# Patient Record
Sex: MALE | Race: WHITE | NOT HISPANIC OR LATINO | Employment: FULL TIME | URBAN - METROPOLITAN AREA
[De-identification: names, ages, dates, MRNs, and addresses within clinical notes are randomized per-mention and may not be internally consistent; named-entity substitution may affect disease eponyms.]

---

## 2023-10-10 DIAGNOSIS — R56.9 SEIZURE (MULTI): Primary | ICD-10-CM

## 2023-10-10 RX ORDER — LEVETIRACETAM 750 MG/1
2250 TABLET, EXTENDED RELEASE ORAL DAILY
Qty: 240 TABLET | Refills: 0 | Status: SHIPPED | OUTPATIENT
Start: 2023-10-10 | End: 2023-12-07

## 2023-10-10 NOTE — PROGRESS NOTES
Spoke to Shiva,     Had been discharge from EMU in July 2023 - with instructions to take LEV XR 3g at bedtime. He had got home and still had a large supple of LEV IR 500mg tabs that he wanted to use up. Spoke to him last week as he was going to switch over to the XR version and instructed him to take 4 750mg tablets at bedtime.     He called with side effects from the medication feeling drunk and off-balance. Upon further clarification he has only been taking LEV IR 1000mg bid rather than 1500mg bid.     Asked him to reduce his XR to 3 750mg tabs at bedtime for a total of 2250mg. He will use this dose and call me if he is still experiencing side effects.

## 2023-11-13 ENCOUNTER — TELEPHONE (OUTPATIENT)
Dept: NEUROLOGY | Facility: HOSPITAL | Age: 58
End: 2023-11-13
Payer: MEDICARE

## 2023-11-13 NOTE — TELEPHONE ENCOUNTER
"Spoke with Mr. Barraza who called to report having 2 \"big\" seizures last Thursday and last Friday. He is concerned about the intesnsity of these seizures as well as having some difficulty with his memory since then.    Dr. Jenkins had an opening tomorrow morning for clinic, so I was able to schedule the patient a virtual appointment for him to discuss these new episodes. His wife Teresita will be available during the appointment to provide collateral information.  "

## 2023-11-14 ENCOUNTER — TELEMEDICINE (OUTPATIENT)
Dept: NEUROLOGY | Facility: HOSPITAL | Age: 58
End: 2023-11-14
Payer: MEDICARE

## 2023-11-14 ENCOUNTER — TELEPHONE (OUTPATIENT)
Dept: NEUROLOGY | Facility: HOSPITAL | Age: 58
End: 2023-11-14

## 2023-11-14 DIAGNOSIS — G40.019 LOCALIZATION-RELATED EPILEPSY, INTRACTABLE (MULTI): Primary | ICD-10-CM

## 2023-11-14 DIAGNOSIS — G40.109 TEMPORAL LOBE EPILEPSY (MULTI): ICD-10-CM

## 2023-11-14 PROCEDURE — 99417 PROLNG OP E/M EACH 15 MIN: CPT | Performed by: STUDENT IN AN ORGANIZED HEALTH CARE EDUCATION/TRAINING PROGRAM

## 2023-11-14 PROCEDURE — 99215 OFFICE O/P EST HI 40 MIN: CPT | Performed by: STUDENT IN AN ORGANIZED HEALTH CARE EDUCATION/TRAINING PROGRAM

## 2023-11-14 PROCEDURE — 99443 PR PHYS/QHP TELEPHONE EVALUATION 21-30 MIN: CPT | Performed by: STUDENT IN AN ORGANIZED HEALTH CARE EDUCATION/TRAINING PROGRAM

## 2023-11-14 NOTE — TELEPHONE ENCOUNTER
Spoke today with Shiva and his wife Teresita to let them know the newly added medication Xcopri is ready for them to  at the pharmacy. Explained the titration schedule (12.5 mg daily for 2 weeks, then 25 mg daily for 2 weeks, etc etc.) and we agreed to keep in touch over the coming weeks so we are up to speed on how he is tolerating things.

## 2023-11-14 NOTE — PROGRESS NOTES
Virtual or Telephone Consent    A telephone visit (audio only) between the patient (at the originating site) and the provider (at the distant site) was utilized to provide this telehealth service.   Verbal consent was requested and obtained from Shiva Barraza on this date, 11/14/23 for a telehealth visit.       Subjective     Shiva Barraza is a 58 y.o. year old LH male who presents for follow up of seizures.    4-D CLASSIFICATION:   Type: EPE   Semiology: Psychic (Ecstatic, ángel vu) aura->Dialeptic*->Right face clonic->B/L tonic-clonic sz**   Lateralizing signs: Right M2e, Right versive   Freq: *once every 2 months (last on 11/10/23, **once every 6 months (last in 02/2023)  EZ: Left temporal   Etiology: Unknown   RMC: s/p left MHT and temporal lobectomy, mild dyslexia and memory decline   Current AEDs: -200-400, LEV XR 750mg, 4 tabs bedtime  Past AEDs: Topiramate, LAC (side effects), ZNS (side effects), Onfi (never tried)      HPI  INTERVAL HX:  He was last seen in clinic in 07/2023. At that time, we had planned to add onfi because he was continuing to have auras but apparently, he had stopped all medications in 06/2023 because of feeling fatigued. Following this, he had an AMU stay but no seizures were captured. He was placed back on the medications with a titration schedule for lamotrigine.     Since then, he followed by [facility] and was transitioned to LEV XR. He has been consistently taking LEV XR 750mg, 4 tabs bedtime and LMT as prescribed since 09/2023.     He had two witnessed seizures last week. Wife described that he reported feeling his aura because he could tell the seizure was coming on and afterward he was incoherent. No convulsion was witnessed. Since then, he feels that his memory has been poor.    Past Medical History:   Diagnosis Date    Personal history of urinary calculi     Personal history of renal calculi     Past Surgical History:   Procedure Laterality Date    HEMORRHOID SURGERY  05/03/2017     Hemorrhoidectomy    OTHER SURGICAL HISTORY  09/16/2013    Craniotomy (Therapeutic)    OTHER SURGICAL HISTORY  04/08/2014    Craniotomy For Temporal Lobectomy With Electrocorticography     Social History     Tobacco Use    Smoking status: Not on file    Smokeless tobacco: Not on file   Substance Use Topics    Alcohol use: Not on file     No family history on file.    Current Outpatient Medications:     cenobamate 12.5 mg (14)- 25 mg (14) tablets,dose pack, Take 12.5 mg by mouth once daily for 14 days, THEN 25 mg once daily for 14 days., Disp: 1 each, Rfl: 0    [START ON 1/9/2024] cenobamate 150 mg (14)- 200 mg (14) tablets,dose pack, Take 150 mg by mouth once daily for 14 days, THEN 200 mg once daily for 14 days. Do not start before January 9, 2024., Disp: 1 each, Rfl: 0    [START ON 12/12/2023] cenobamate 50 mg (14)- 100 mg (14) tablets,dose pack, Take 50 mg by mouth once daily for 14 days, THEN 100 mg once daily for 14 days. Do not start before December 12, 2023., Disp: 1 each, Rfl: 0    lamoTRIgine (LaMICtal) 200 mg tablet, TAKE 1 TABLET IN THE MORNING, TAKE 1 TABLET IN THE AFTERNOON, AND 2 TABLETS AT NIGHT, Disp: 120 tablet, Rfl: 0    levETIRAcetam XR (Keppra XR) 750 mg tablet extended release 24 hr 24 hr tablet, Take 3 tablets (2,250 mg) by mouth once daily., Disp: 240 tablet, Rfl: 0  No Known Allergies    Review of Systems  As per HPI, otherwise all other systems have been reviewed are negative for complaint.        Assessment/Plan   Shiva Barraza is a 58 y.o. year old LH male who presents for follow up of seizures.    4-D CLASSIFICATION:   Type: EPE   Semiology: Psychic (Ecstatic, ángel vu) aura->Dialeptic*->Right face clonic->B/L tonic-clonic sz**   Lateralizing signs: Right M2e, Right versive   Freq: *once every 2 months (last on 11/10/23, **once every 6 months (last in 02/2023)  EZ: Left temporal   Etiology: Unknown   RMC: s/p left MHT and temporal lobectomy, mild dyslexia and memory decline   Current AEDs:  -200-400, LEV XR 750mg, 4 tabs bedtime  Past AEDs: Topiramate, LAC (side effects), ZNS (side effects), Onfi (never tried)    Plan:  - At this time, increasing the dose of levetiracetam is likely to cause side effects without adding efficacy. It is reasonable to add another medication.  - We discussed Xcopri as an excellent adjunctive option for focal epilepsy. We will send the prescription today.  - He must continue to follow standard seizure precautions until further notice.    F/U with [facility] in 3 months.    Shayan Jenkins MD  [facility]    The total appointment time today was 45 minutes. Time included preparing to see the patient, obtaining the history, performing a medically necessary appropriate physical examination, counseling and educating the patient/family/caregiver, ordering medications, tests and procedures, referring and communicating with other providers, independently interpreting results and communicating the results to the patient/family/caregiver, care coordination] and documenting clinical information in the medical record.

## 2023-12-06 ENCOUNTER — TELEMEDICINE (OUTPATIENT)
Dept: NEUROLOGY | Facility: HOSPITAL | Age: 58
End: 2023-12-06
Payer: MEDICARE

## 2023-12-06 DIAGNOSIS — G40.109 TEMPORAL LOBE EPILEPSY (MULTI): Primary | ICD-10-CM

## 2023-12-06 DIAGNOSIS — F51.04 CHRONIC INSOMNIA: ICD-10-CM

## 2023-12-06 PROCEDURE — 99443 PR PHYS/QHP TELEPHONE EVALUATION 21-30 MIN: CPT | Performed by: STUDENT IN AN ORGANIZED HEALTH CARE EDUCATION/TRAINING PROGRAM

## 2023-12-06 NOTE — PROGRESS NOTES
Virtual or Telephone Consent    A telephone visit (audio only) between the patient (at the originating site) and the provider (at the distant site) was utilized to provide this telehealth service.   Verbal consent was requested and obtained from Shiva Barraza on this date, 12/06/23 for a telehealth visit.       Subjective     Shiva Barraza is a 58 y.o. year old   LH male who presents for follow up of seizures.    4-D CLASSIFICATION:   Type: EPE   Semiology: Psychic (Ecstatic, ángel vu) aura->Dialeptic*->Right face clonic->B/L tonic-clonic sz**   Lateralizing signs: Right M2e, Right versive   Freq: *once every 2 months (last on 11/10/23, **once every 6 months (last in 02/2023)  EZ: Left temporal   Etiology: Unknown   RMC: s/p left MHT and temporal lobectomy, mild dyslexia and memory decline   Current AEDs: -200-400, LEV XR 750mg, 4 tabs bedtime, Xcopri 25mg OD  Past AEDs: Topiramate, LAC (side effects), ZNS (side effects), Onfi (never tried)      HPI    INTERVAL HX:  He was seen last month. We added xcopri to his regimen because of continued seizures. He has been experiencing worsening fogginess, memory lapses and increased irritability since then. He hasn't had any seizures though.    Past Medical History:   Diagnosis Date    Personal history of urinary calculi     Personal history of renal calculi     Past Surgical History:   Procedure Laterality Date    HEMORRHOID SURGERY  05/03/2017    Hemorrhoidectomy    OTHER SURGICAL HISTORY  09/16/2013    Craniotomy (Therapeutic)    OTHER SURGICAL HISTORY  04/08/2014    Craniotomy For Temporal Lobectomy With Electrocorticography     Social History     Tobacco Use    Smoking status: Not on file    Smokeless tobacco: Not on file   Substance Use Topics    Alcohol use: Not on file     No family history on file.    Current Outpatient Medications:     cenobamate 12.5 mg (14)- 25 mg (14) tablets,dose pack, Take 12.5 mg by mouth once daily for 14 days, THEN 25 mg once daily for 14  days., Disp: 1 each, Rfl: 0    [START ON 1/9/2024] cenobamate 150 mg (14)- 200 mg (14) tablets,dose pack, Take 150 mg by mouth once daily for 14 days, THEN 200 mg once daily for 14 days. Do not start before January 9, 2024., Disp: 1 each, Rfl: 0    [START ON 12/12/2023] cenobamate 50 mg (14)- 100 mg (14) tablets,dose pack, Take 50 mg by mouth once daily for 14 days, THEN 100 mg once daily for 14 days. Do not start before December 12, 2023., Disp: 1 each, Rfl: 0    lamoTRIgine (LaMICtal) 200 mg tablet, TAKE 1 TABLET IN THE MORNING, TAKE 1 TABLET IN THE AFTERNOON, AND 2 TABLETS AT NIGHT, Disp: 120 tablet, Rfl: 0    levETIRAcetam XR (Keppra XR) 750 mg tablet extended release 24 hr 24 hr tablet, Take 3 tablets (2,250 mg) by mouth once daily., Disp: 240 tablet, Rfl: 0  No Known Allergies    Review of Systems  As per HPI, otherwise all other systems have been reviewed are negative for complaint.            Assessment/Plan   Shiva Barraza is a 58 y.o. year old   LH male who presents for follow up of seizures.    4-D CLASSIFICATION:   Type: EPE   Semiology: Psychic (Ecstatic, ángel vu) aura->Dialeptic*->Right face clonic->B/L tonic-clonic sz**   Lateralizing signs: Right M2e, Right versive   Freq: *once every 2 months (last on 11/10/23, **once every 6 months (last in 02/2023)  EZ: Left temporal   Etiology: Unknown   RMC: s/p left MHT and temporal lobectomy, mild dyslexia and memory decline   Current AEDs: -200-400, LEV XR 750mg, 4 tabs bedtime, Xcopri 25mg OD  Past AEDs: Topiramate, LAC (side effects), ZNS (side effects), Onfi (never tried)    Plan:  - It is likely that the side effects of irritability and memory issues are related to xcopri. Since he is still in the very early days of uptitration, it is possible that these side effects would improve. We should continue with the titration plan as such.   - He also told me about his poor sleep for some time now. He is usually able to sleep for an hour or so before he wakes  up. Now he is experiencing morning wake up headaches as well. I have provided him with a referral to sleep medicine.    F/U in 2-3 months.    Shayan Jenkins MD  [facility]    The total appointment time today was 30 minutes. Time included preparing to see the patient, obtaining the history, performing a medically necessary appropriate physical examination, counseling and educating the patient/family/caregiver, ordering medications, tests and procedures, referring and communicating with other providers, independently interpreting results and communicating the results to the patient/family/caregiver, care coordination] and documenting clinical information in the medical record.

## 2023-12-07 ENCOUNTER — APPOINTMENT (OUTPATIENT)
Dept: NEUROLOGY | Facility: HOSPITAL | Age: 58
End: 2023-12-07
Payer: MEDICARE

## 2024-02-12 DIAGNOSIS — G40.109 TEMPORAL LOBE EPILEPSY (MULTI): ICD-10-CM

## 2024-02-12 DIAGNOSIS — G40.019 LOCALIZATION-RELATED EPILEPSY, INTRACTABLE (MULTI): Primary | ICD-10-CM

## 2024-02-12 DIAGNOSIS — R56.9 SEIZURE (MULTI): ICD-10-CM

## 2024-02-12 RX ORDER — CENOBAMATE 200 MG/1
200 TABLET, FILM COATED ORAL DAILY
Qty: 180 TABLET | Refills: 1 | Status: SHIPPED | OUTPATIENT
Start: 2024-02-12 | End: 2024-03-19 | Stop reason: ALTCHOICE

## 2024-02-12 RX ORDER — LEVETIRACETAM 750 MG/1
3000 TABLET, EXTENDED RELEASE ORAL DAILY
Qty: 270 TABLET | Refills: 1 | Status: SHIPPED | OUTPATIENT
Start: 2024-02-12 | End: 2025-02-11

## 2024-02-12 NOTE — PROGRESS NOTES
I have personally reviewed the OARRS report for this patient. I have considered the risks of abuse, dependence, addiction and diversion. I believe that it is clinically appropriate for this patient to be prescribed this medication based on documented diagnosis of epilepsy. Stopping this medication could result in a life threatening seizure.

## 2024-03-18 ENCOUNTER — TELEMEDICINE (OUTPATIENT)
Dept: NEUROLOGY | Facility: CLINIC | Age: 59
End: 2024-03-18
Payer: MEDICARE

## 2024-03-18 DIAGNOSIS — G40.019 LOCALIZATION-RELATED EPILEPSY, INTRACTABLE (MULTI): Primary | ICD-10-CM

## 2024-03-18 PROCEDURE — 99214 OFFICE O/P EST MOD 30 MIN: CPT | Performed by: NURSE PRACTITIONER

## 2024-03-18 PROCEDURE — 99214 OFFICE O/P EST MOD 30 MIN: CPT | Mod: GT,95 | Performed by: NURSE PRACTITIONER

## 2024-03-18 NOTE — PROGRESS NOTES
Virtual or Telephone Consent    A telephone visit (audio only) between the patient (at the originating site) and the provider (at the distant site) was utilized to provide this telehealth service.   Verbal consent was requested and obtained from Shiva Barraza on this date, 03/20/24 for a telehealth visit.      Patient ID: Shiva Barraza 59 y.o.male presenting in follow-up for epilepsy.     HPI    4-D CLASSIFICATION:   Type: EPE   Semiology: Psychic (Ecstatic, ángel vu) aura->Dialeptic*->Right face clonic->B/L tonic-clonic sz**   Lateralizing signs: Right M2e, Right versive   Freq: *once every 2 months, **once every 6 months (last in 02/2023)  EZ: Left temporal   Etiology: Unknown   RMC: s/p left MHT and temporal lobectomy, mild dyslexia and memory decline   Current AEDs: -200-400, LEV 1500mg BID  Past AEDs: Topiramate, LAC (side effects), ZNS (side effects)     EMU 08/2023:  No seizure was captured this admission even with sleep   deprivation.Patient was discharged with Keppra XR 3000 mg at bedtime and Lamictal   titration, initial titration instruction was given (He used to take  â€“   200 â€“ 400).        PRESENT CONCERNS:  Nov 16 and 17th - dialeptic seizures 2 days in a row   1/02- dialeptic seizure : up to taking 100mg of xcopri   2/15- 9am states a true GTC seizure - up to 200mg of xcopri     Feels more tired in the daytime   Feels more confused than normal   Feels the worst he has ever felt on this medication and is costing him 400$       Review of Systems  All other systems reviewed and negative unless otherwise stated above    CONTROLLED SUBSTANCE  OARRS:  No data recorded  I have personally reviewed the OARRS report for Shiva Barraza. I have considered the risks of abuse, dependence, addiction and diversion and I believe that it is clinically appropriate for Shiva Barraza to be prescribed this medication    Is the patient prescribed a combination of a benzodiazepine and opioid?  No    Last Urine Drug Screen /  ordered today: No  No results found for this or any previous visit (from the past 8760 hour(s)).    Clinical rationale for not completing a Urine Drug Screen: Patient prescribed controlled substance for management of seizure, epilepsy, movement disorder      RESULTS:  EEG:  No EEG results found for the past 12 months    EKG:  No results found for this or any previous visit (from the past 4464 hour(s)).    LABS:      IMAGING:  MR brain w and wo IV contrast    Result Date: 4/19/2023  Interpreted By:  OMID MONTEIRO MD MRN: [id number] Patient Name: OPHELIA HUMPHREY  STUDY: MRI BRAIN W/WO CONTRAST;  4/18/2023 6:30 pm  INDICATION: Intractable left temporal lobe epilepsy  G40.909: Seizure disorder G40.309: Epileptic seizure, generalized, convulsive G40.109: Temporal lobe epilepsy.  COMPARISON: 07/27/2017  ACCESSION NUMBER(S): [id number]  ORDERING CLINICIAN: WESTON FELIX  TECHNIQUE: Axial diffusion, coronal T2, volumetric FLAIR, axial gradient echo T2, MPRAGE, post gadolinium volumetric T1, as well as post gadolinium axial T1 weighted MRI images of the brain were obtained. The patient received  16 mL of  Dotarem gadolinium intravenously.  FINDINGS: Postoperative changes are again identified compatible with a previous left-sided craniotomy.  There is again evidence of mild smooth dural enhancement deep to and surrounding the craniotomy site likely postsurgical/reactive in etiology.  A surgical resection cavity is again noted deep to the craniotomy site along the left temporal lobe. There is again evidence of bright signal on the FLAIR and T2 weighted images within the surrounding brain parenchyma suggesting surrounding encephalomalacia/gliosis. There is similar surrounding brain parenchymal volume loss with compensatory dilatation of the left lateral ventricle.  Additional areas of encephalomalacia/gliosis are again noted within the left parietal lobe and to a lesser degree left frontal lobe.  No new abnormal  intracranial enhancement is identified on the postcontrast images.  The above findings are again noted be superimposed upon mild-to-moderate brain parenchymal volume loss.  The diffusion weighted images fail to demonstrate abnormal diffusion restriction to suggest acute infarction.  The visualized paranasal sinuses and mastoid air cells are clear.      Postoperative changes are again identified compatible with a previous left-sided craniotomy.  There is again evidence of mild smooth dural enhancement deep to and surrounding the craniotomy site likely postsurgical/reactive in etiology.  A surgical resection cavity is again noted deep to the craniotomy site along the left temporal lobe. There is again evidence of bright signal on the FLAIR and T2 weighted images within the surrounding brain parenchyma suggesting surrounding encephalomalacia/gliosis. There is similar surrounding brain parenchymal volume loss with compensatory dilatation of the left lateral ventricle.  Additional areas of encephalomalacia/gliosis are again noted within the left parietal lobe and to a lesser degree left frontal lobe.  No new abnormal intracranial enhancement is identified on the postcontrast images.  The above findings are again noted be superimposed upon mild-to-moderate brain parenchymal volume loss.  The study was interpreted at [facility].     No CT head results found for the past 12 months    Results for orders placed or performed in visit on 04/18/23   MR brain w and wo IV contrast    Narrative    Interpreted By:  OMID MONTEIRO MD  MRN: [id number]  Patient Name: OPHELIA HUMPHREY     STUDY:  MRI BRAIN W/WO CONTRAST;  4/18/2023 6:30 pm     INDICATION:  Intractable left temporal lobe epilepsy  G40.909: Seizure disorder  G40.309: Epileptic seizure, generalized, convulsive G40.109: Temporal  lobe epilepsy.     COMPARISON:  07/27/2017     ACCESSION NUMBER(S):  [id number]     ORDERING CLINICIAN:  WESTON  ISIDRO     TECHNIQUE:  Axial diffusion, coronal T2, volumetric FLAIR, axial gradient echo  T2, MPRAGE, post gadolinium volumetric T1, as well as post gadolinium  axial T1 weighted MRI images of the brain were obtained. The patient  received  16 mL of  Dotarem gadolinium intravenously.     FINDINGS:  Postoperative changes are again identified compatible with a previous  left-sided craniotomy.     There is again evidence of mild smooth dural enhancement deep to and  surrounding the craniotomy site likely postsurgical/reactive in  etiology.     A surgical resection cavity is again noted deep to the craniotomy  site along the left temporal lobe. There is again evidence of bright  signal on the FLAIR and T2 weighted images within the surrounding  brain parenchyma suggesting surrounding encephalomalacia/gliosis.  There is similar surrounding brain parenchymal volume loss with  compensatory dilatation of the left lateral ventricle.     Additional areas of encephalomalacia/gliosis are again noted within  the left parietal lobe and to a lesser degree left frontal lobe.     No new abnormal intracranial enhancement is identified on the  postcontrast images.     The above findings are again noted be superimposed upon  mild-to-moderate brain parenchymal volume loss.     The diffusion weighted images fail to demonstrate abnormal diffusion  restriction to suggest acute infarction.     The visualized paranasal sinuses and mastoid air cells are clear.       Impression    Postoperative changes are again identified compatible with a previous  left-sided craniotomy.     There is again evidence of mild smooth dural enhancement deep to and  surrounding the craniotomy site likely postsurgical/reactive in  etiology.     A surgical resection cavity is again noted deep to the craniotomy  site along the left temporal lobe. There is again evidence of bright  signal on the FLAIR and T2 weighted images within the surrounding  brain  parenchyma suggesting surrounding encephalomalacia/gliosis.  There is similar surrounding brain parenchymal volume loss with  compensatory dilatation of the left lateral ventricle.     Additional areas of encephalomalacia/gliosis are again noted within  the left parietal lobe and to a lesser degree left frontal lobe.     No new abnormal intracranial enhancement is identified on the  postcontrast images.     The above findings are again noted be superimposed upon  mild-to-moderate brain parenchymal volume loss.     The study was interpreted at [facility].   Results for orders placed or performed in visit on 04/10/15   MR CERVICAL SPINE WO IV CONTRAST    Narrative    MR CERVICAL SPINE     Clinical history: Brachial neuritis.  Left shoulder pain.  Left upper   extremity weakness and atrophy.     T1 and T2 weighted sagittal and axial images were obtained.    Comparison: none.     There is slight anterior listhesis of C2 upon C3.  There is diffuse   disc desiccation.  There is loss of disc height with endplate   hypertrophic changes most prominent at C5-6 and C6-7.  Though there   are endplate marrow degenerative changes, no definite focal marrow   lesion is identified.  The spinal cord appears normal in size and   signal.     At C2-3 there is a posterior disc bulge with some uncovertebral   hypertrophy.  There is minimal flattening of the thecal sac without   greater canal stenosis.  There is minimal compromise of the neural   foramen.  At C3-4 there is a broad disc osteophyte complex that produces mild   canal stenosis.  There is mild left greater than right compromise of   foramen.     At C4-5 there is a broad disc osteophyte complex that produces mild   to moderate canal stenosis with mild flattening of the anterior cord   surface.  There is moderate compromise of bilateral foramen.  At C5-6 there is disc osteophyte complex producing mild to moderate   canal stenosis with minimal  flattening of the right anterior surface   of the cord.  There is moderate to severe compromise of bilateral   foramen.     At C6-7-1 broad disc osteophyte complex produces mild canal stenosis.    There is moderate left and mild right compromise of foramen.  At C7-T1 there is a slight posterior disc bulge without significant   stenosis.  .  IMPRESSION:     Grade 1 anterior spinal listhesis C2 upon C3.     Multifocal degenerative changes are most prominent at C4-5 centrally   and involving foramen bilaterally at C5-6.  .  Interpreted at [facility], [place], [place]       Vitals:  There were no vitals filed for this visit.    PHYSICAL EXAM:  Neurologic Exam   EXAM  This examination was performed over telehealth by telephone discussion--Patient is alert and oriented. Speech is fluid, without dysarthria. Able to appropriately give information about past history and current events. Further objective neurological testing not possible given nature of phone interview.      ASSESSMENT & PLAN:   59 y.o. male presenting in follow-up for peviously diagnosed epilepsy. Semiology as described above    Problem List Items Addressed This Visit    None  Visit Diagnoses       Localization-related epilepsy, intractable (CMS/HCC)    -  Primary    Relevant Medications    cenobamate (Xcopri) 100 mg tablet    cenobamate (Xcopri) 50 mg tablet            Decrease Xcopri to 150mg for 3 days then to 100mg   Will see if side effects improve  Knows to contact me if increase in seizure frequency or severity   RTC 3 months if stable

## 2024-03-19 RX ORDER — CENOBAMATE 50 MG/1
TABLET, FILM COATED ORAL
Qty: 3 TABLET | Refills: 0 | Status: SHIPPED | OUTPATIENT
Start: 2024-03-19 | End: 2024-05-03 | Stop reason: ALTCHOICE

## 2024-03-19 RX ORDER — CENOBAMATE 100 MG/1
TABLET, FILM COATED ORAL
Qty: 30 TABLET | Refills: 2 | Status: SHIPPED | OUTPATIENT
Start: 2024-03-19 | End: 2024-05-03 | Stop reason: SINTOL

## 2024-03-21 DIAGNOSIS — G40.109 TEMPORAL LOBE EPILEPSY (MULTI): Primary | ICD-10-CM

## 2024-03-21 RX ORDER — CENOBAMATE 50MG-100MG
KIT ORAL
Qty: 1 EACH | Refills: 0 | Status: SHIPPED | OUTPATIENT
Start: 2024-03-21 | End: 2024-05-03 | Stop reason: SINTOL

## 2024-05-03 DIAGNOSIS — G40.109 TEMPORAL LOBE EPILEPSY (MULTI): Primary | ICD-10-CM

## 2024-05-03 RX ORDER — DIVALPROEX SODIUM 500 MG/1
500 TABLET, FILM COATED, EXTENDED RELEASE ORAL DAILY
Start: 2024-05-03 | End: 2024-07-02

## 2024-05-03 NOTE — PROGRESS NOTES
"Shiva is having continued side effects with Xcopri.   They are keeping him awake at night 3-4 hours per night, he feels brain fog and states he feels \"dumb\". We have tried lowering the dose from 200mg to 100mg and he has had no improvement in side effects. Had GTC last week. We will plan to stop his Xcopri and add depakote. I explained to Shiva that the depakote could increase his blood levels of lamotrigine and we will need to monitor blood work and side effects. For ease of administration we will start with Depakote ER 500mg at bedtime. In 1 week I would like to obtain lamotrigine serum level. Will plan for further Depakote increase as needed.  "

## 2024-05-30 DIAGNOSIS — G40.909 EPILEPSY, UNSPECIFIED, NOT INTRACTABLE, WITHOUT STATUS EPILEPTICUS (MULTI): ICD-10-CM

## 2024-05-30 RX ORDER — LAMOTRIGINE 200 MG/1
TABLET ORAL
Qty: 120 TABLET | Refills: 11 | Status: SHIPPED | OUTPATIENT
Start: 2024-05-30

## 2024-08-08 ENCOUNTER — INPATIENT HOSPITAL (OUTPATIENT)
Dept: URBAN - METROPOLITAN AREA HOSPITAL 118 | Facility: HOSPITAL | Age: 59
End: 2024-08-08
Payer: MEDICARE

## 2024-08-08 DIAGNOSIS — K21.9 GASTRO-ESOPHAGEAL REFLUX DISEASE WITHOUT ESOPHAGITIS: ICD-10-CM

## 2024-08-08 DIAGNOSIS — D50.0 IRON DEFICIENCY ANEMIA SECONDARY TO BLOOD LOSS (CHRONIC): ICD-10-CM

## 2024-08-08 DIAGNOSIS — K62.5 HEMORRHAGE OF ANUS AND RECTUM: ICD-10-CM

## 2024-08-08 PROCEDURE — 99223 1ST HOSP IP/OBS HIGH 75: CPT | Mod: FS | Performed by: INTERNAL MEDICINE

## 2024-09-23 ENCOUNTER — OFFICE VISIT (OUTPATIENT)
Dept: NEUROLOGY | Facility: HOSPITAL | Age: 59
End: 2024-09-23
Payer: MEDICARE

## 2024-09-23 VITALS
BODY MASS INDEX: 23.05 KG/M2 | SYSTOLIC BLOOD PRESSURE: 117 MMHG | TEMPERATURE: 97.5 F | WEIGHT: 161 LBS | DIASTOLIC BLOOD PRESSURE: 75 MMHG | RESPIRATION RATE: 18 BRPM | HEIGHT: 70 IN | HEART RATE: 74 BPM

## 2024-09-23 DIAGNOSIS — G40.109 TEMPORAL LOBE EPILEPSY (MULTI): Primary | ICD-10-CM

## 2024-09-23 PROCEDURE — 99215 OFFICE O/P EST HI 40 MIN: CPT | Performed by: STUDENT IN AN ORGANIZED HEALTH CARE EDUCATION/TRAINING PROGRAM

## 2024-09-23 PROCEDURE — 3008F BODY MASS INDEX DOCD: CPT | Performed by: STUDENT IN AN ORGANIZED HEALTH CARE EDUCATION/TRAINING PROGRAM

## 2024-09-23 ASSESSMENT — PAIN SCALES - GENERAL: PAINLEVEL: 0-NO PAIN

## 2024-09-24 NOTE — PROGRESS NOTES
"Subjective     Shiva Barraza is a 59 y.o. year old left-handed male who presents for follow up of epilepsy.    4-D CLASSIFICATION:   Type: EPE   Semiology: Psychic (Ecstatic, ángel vu) aura->Dialeptic->Right face clonic->B/L tonic-clonic sz  Lateralizing signs: Right M2e, Right versive   Freq: None in last 6 months  EZ: Left temporal   Etiology: Unknown   RMC: s/p left MHT and temporal lobectomy, mild dyslexia and memory decline   Current AEDs: -200-400, LEV ER 3000mg QHS  Past AEDs: Topiramate, LAC (side effects), ZNS (side effects), Xcopri (cognitive and psychiatric side effects)    HPI  INTERVAL HX:  He was last seen by HAMILTON Gallagher in 03/2024. At that time, he had been uptitrated on lamotrigine and he was still on xcopri. However, he was experiencing seizures and severe side effects. Xcopri was reduced, however, the side effects did not improve, hence we stopped it completely.    He feels much better now and has remained seizure free since March 2024 on lamotrigine and LEV ER.     He is very active at home with home projects and helps his neighbors as well. He did have one mechanical fall a couple of months ago with a forehead laceration.       Current Outpatient Medications:     divalproex (Depakote ER) 500 mg 24 hr tablet, Take 1 tablet (500 mg) by mouth once daily. Do not crush, chew, or split., Disp: , Rfl:     lamoTRIgine (LaMICtal) 200 mg tablet, TAKE 1 TABLET IN THE MORNING, TAKE 1 TABLET IN THE AFTERNOON, AND 2 TABLETS AT NIGHT, Disp: 120 tablet, Rfl: 11    levETIRAcetam XR (Keppra XR) 750 mg tablet extended release 24 hr 24 hr tablet, Take 4 tablets (3,000 mg) by mouth once daily., Disp: 270 tablet, Rfl: 1  No Known Allergies    Review of Systems  As per HPI, otherwise all other systems have been reviewed are negative for complaint.      Objective   /75   Pulse 74   Temp 36.4 °C (97.5 °F)   Resp 18   Ht 1.778 m (5' 10\")   Wt 73 kg (161 lb)   BMI 23.10 kg/m²     Neurological Exam  Physical " Exam  Awake, alert, oriented, language function intact for comprehension and fluency  Reading and writing mildly impaired  Attention and conc okay  Face symmetric  Moves both arms equally  Normal gait      Assessment/Plan   Shiva Barraza is a 59 y.o. year old left-handed male who presents for follow up of epilepsy.    4-D CLASSIFICATION:   Type: EPE   Semiology: Psychic (Ecstatic, ángel vu) aura->Dialeptic->Right face clonic->B/L tonic-clonic sz  Lateralizing signs: Right M2e, Right versive   Freq: None in last 6 months  EZ: Left temporal   Etiology: Unknown   RMC: s/p left MHT and temporal lobectomy, mild dyslexia and memory decline   Current AEDs: -200-400, LEV ER 3000mg QHS  Past AEDs: Topiramate, LAC (side effects), ZNS (side effects), Xcopri (cognitive and psychiatric side effects)    Plan:  - Shiva is doing really well on the current regimen and this is the first time he has remained seizure free in 6 months. He is tolerating the regimen well.  - Continue same.   - F/U with HAMILTON Gallagher in 6 months.  - Since he has not had a seizure in 6 months, he is cleared to drive at this point.    Shayan Jenkins MD  [facility]    The total appointment time today was 40 minutes. Time included preparing to see the patient, obtaining the history, performing a medically necessary appropriate physical examination, counseling and educating the patient/family/caregiver, ordering medications, tests and procedures, referring and communicating with other providers, independently interpreting results and communicating the results to the patient/family/caregiver, care coordination] and documenting clinical information in the medical record.

## 2024-10-16 DIAGNOSIS — R56.9 SEIZURE (MULTI): ICD-10-CM

## 2024-10-16 RX ORDER — LEVETIRACETAM 750 MG/1
3000 TABLET, EXTENDED RELEASE ORAL NIGHTLY
Qty: 360 TABLET | Refills: 3 | Status: SHIPPED | OUTPATIENT
Start: 2024-10-16

## 2024-11-14 PROBLEM — K62.5 RECTUM BLEEDING: Status: ACTIVE | Noted: 2024-11-14

## 2024-11-14 PROBLEM — K64.0 FIRST DEGREE HEMORRHOIDS: Status: ACTIVE | Noted: 2024-11-14

## 2024-11-14 PROBLEM — K63.89 OTHER SPECIFIED DISEASES OF INTESTINE: Status: ACTIVE | Noted: 2024-11-14

## 2024-11-14 PROBLEM — Z86.010 PERSONAL HISTORY OF COLONIC POLYPS: Status: ACTIVE | Noted: 2024-11-14

## 2025-03-13 ENCOUNTER — AMBULATORY SURGICAL CENTER (OUTPATIENT)
Dept: URBAN - METROPOLITAN AREA SURGERY 6 | Facility: SURGERY | Age: 60
End: 2025-03-13
Payer: MEDICARE

## 2025-03-13 ENCOUNTER — OFFICE (OUTPATIENT)
Dept: URBAN - METROPOLITAN AREA PATHOLOGY 6 | Facility: PATHOLOGY | Age: 60
End: 2025-03-13
Payer: MEDICARE

## 2025-03-13 VITALS
DIASTOLIC BLOOD PRESSURE: 82 MMHG | WEIGHT: 168 LBS | OXYGEN SATURATION: 96 % | SYSTOLIC BLOOD PRESSURE: 115 MMHG | DIASTOLIC BLOOD PRESSURE: 76 MMHG | DIASTOLIC BLOOD PRESSURE: 84 MMHG | HEART RATE: 69 BPM | OXYGEN SATURATION: 95 % | OXYGEN SATURATION: 97 % | DIASTOLIC BLOOD PRESSURE: 87 MMHG | OXYGEN SATURATION: 99 % | TEMPERATURE: 97.5 F | SYSTOLIC BLOOD PRESSURE: 103 MMHG | SYSTOLIC BLOOD PRESSURE: 127 MMHG | SYSTOLIC BLOOD PRESSURE: 113 MMHG | SYSTOLIC BLOOD PRESSURE: 119 MMHG | RESPIRATION RATE: 18 BRPM | HEART RATE: 67 BPM | HEART RATE: 70 BPM | HEART RATE: 75 BPM | RESPIRATION RATE: 5 BRPM | DIASTOLIC BLOOD PRESSURE: 85 MMHG | RESPIRATION RATE: 14 BRPM | HEIGHT: 70 IN | HEART RATE: 64 BPM

## 2025-03-13 DIAGNOSIS — K31.89 OTHER DISEASES OF STOMACH AND DUODENUM: ICD-10-CM

## 2025-03-13 DIAGNOSIS — D50.9 IRON DEFICIENCY ANEMIA, UNSPECIFIED: ICD-10-CM

## 2025-03-13 DIAGNOSIS — K21.00 GASTRO-ESOPHAGEAL REFLUX DISEASE WITH ESOPHAGITIS, WITHOUT B: ICD-10-CM

## 2025-03-13 PROBLEM — K20.80 OTHER ESOPHAGITIS WITHOUT BLEEDING: Status: ACTIVE | Noted: 2025-03-13

## 2025-03-13 PROCEDURE — 88342 IMHCHEM/IMCYTCHM 1ST ANTB: CPT | Performed by: PATHOLOGY

## 2025-03-13 PROCEDURE — 88305 TISSUE EXAM BY PATHOLOGIST: CPT | Performed by: PATHOLOGY

## 2025-03-13 PROCEDURE — 43239 EGD BIOPSY SINGLE/MULTIPLE: CPT | Performed by: INTERNAL MEDICINE

## 2025-03-13 PROCEDURE — 88313 SPECIAL STAINS GROUP 2: CPT | Performed by: PATHOLOGY

## 2025-04-16 ENCOUNTER — TELEMEDICINE (OUTPATIENT)
Dept: NEUROLOGY | Facility: CLINIC | Age: 60
End: 2025-04-16
Payer: MEDICARE

## 2025-04-16 DIAGNOSIS — G40.109 TEMPORAL LOBE EPILEPSY (MULTI): Primary | ICD-10-CM

## 2025-04-16 PROCEDURE — 99215 OFFICE O/P EST HI 40 MIN: CPT | Performed by: NURSE PRACTITIONER

## 2025-04-16 RX ORDER — FOLIC ACID 0.4 MG
0.4 TABLET ORAL
COMMUNITY
Start: 2025-02-28

## 2025-04-16 RX ORDER — PAROXETINE HYDROCHLORIDE 40 MG/1
40 TABLET, FILM COATED ORAL
COMMUNITY
Start: 2020-10-23

## 2025-04-16 RX ORDER — FERROUS SULFATE 325(65) MG
TABLET ORAL
COMMUNITY

## 2025-04-16 RX ORDER — MIDODRINE HYDROCHLORIDE 5 MG/1
5 TABLET ORAL 2 TIMES DAILY
COMMUNITY

## 2025-04-16 NOTE — PROGRESS NOTES
"Subjective     Shiva Barraza is a 60 y.o. year old left-handed male who presents for follow up of epilepsy.    4-D CLASSIFICATION:   Type: EPE   Semiology: Psychic (Ecstatic, ángel vu) aura->Dialeptic->Right face clonic->B/L tonic-clonic sz  Lateralizing signs: Right M2e, Right versive   Freq: None in last 6 months  EZ: Left temporal   Etiology: Unknown   RMC: s/p left MHT and temporal lobectomy, mild dyslexia and memory decline   Current AEDs: -200-400, LEV ER 3000mg QHS  Past AEDs: Topiramate, LAC (side effects), ZNS (side effects), Xcopri (cognitive and psychiatric side effects)    HPI  4/16/25:  April 1st he was mowing grass, he completed this and was blowing grass off the driveway. He felt an aura and went into the house. His wife Teresita witnessed what she calls a grand-mal seizure. He states he was 'flopping around\". He notes some worsening confusion since the seizure in April.   Had a fall in Nov - related to hypotension/syncope. He is now on midodrine.   Wife and Shiva endorse extreme amount of stress in the week leading up to the seizure, his bank accounts had been hacked online and he was working with this [employer], police, and IT on resolution. Sleep had been poor. He denies missing any medication       9/2024  He was last seen by HAMILTON Gallagher in 03/2024. At that time, he had been uptitrated on lamotrigine and he was still on xcopri. However, he was experiencing seizures and severe side effects. Xcopri was reduced, however, the side effects did not improve, hence we stopped it completely.    He feels much better now and has remained seizure free since March 2024 on lamotrigine and LEV ER.     He is very active at home with home projects and helps his neighbors as well. He did have one mechanical fall a couple of months ago with a forehead laceration.       Current Outpatient Medications:     divalproex (Depakote ER) 500 mg 24 hr tablet, Take 1 tablet (500 mg) by mouth once daily. Do not crush, chew, or split., " Disp: , Rfl:     lamoTRIgine (LaMICtal) 200 mg tablet, TAKE 1 TABLET IN THE MORNING, TAKE 1 TABLET IN THE AFTERNOON, AND 2 TABLETS AT NIGHT, Disp: 120 tablet, Rfl: 11    levETIRAcetam XR (Keppra XR) 750 mg tablet extended release 24 hr 24 hr tablet, TAKE 4 TABLETS BY MOUTH AT BEDTIME, Disp: 360 tablet, Rfl: 3  No Known Allergies    Review of Systems  As per HPI, otherwise all other systems have been reviewed are negative for complaint.      Objective   There were no vitals taken for this visit.    Neurological Exam  Physical Exam  Awake, alert, oriented, language function intact for comprehension and fluency  Reading and writing mildly impaired  Attention and conc okay  Face symmetric  Moves both arms equally  Normal gait      Assessment/Plan   Shiva Barraza is a 59 y.o. year old left-handed male who presents for follow up of epilepsy.    4-D CLASSIFICATION:   Type: EPE   Semiology: Psychic (Ecstatic, ángel vu) aura->Dialeptic->Right face clonic->B/L tonic-clonic sz  Lateralizing signs: Right M2e, Right versive   Freq: None in last 6 months  EZ: Left temporal   Etiology: Unknown   RMC: s/p left MHT and temporal lobectomy, mild dyslexia and memory decline   Current AEDs: -200-400, LEV ER 3000mg QHS  Past AEDs: Topiramate, LAC (side effects), ZNS (side effects), Xcopri (cognitive and psychiatric side effects)    Plan:  - Shiva has been tolerating his current regimen and was 7 months seizure free, he had a breakthrough B/L tonic-clonic sz - likely triggered by stress related to his bank accounts being hacked.  - Continue same. If any more seizures occur before next appt notify me   -neuropsychology repeat testing he endorses worsening memory over the last year  -replaced referral for psychiatry as he never made appt but is still interested   - F/U  3 months.  - Stop driving for 3 months until our next follow up

## 2025-05-31 DIAGNOSIS — G40.909 EPILEPSY, UNSPECIFIED, NOT INTRACTABLE, WITHOUT STATUS EPILEPTICUS: ICD-10-CM

## 2025-06-02 RX ORDER — LAMOTRIGINE 200 MG/1
TABLET ORAL
Qty: 360 TABLET | Refills: 3 | Status: SHIPPED | OUTPATIENT
Start: 2025-06-02

## 2025-08-28 DIAGNOSIS — R56.9 SEIZURE (MULTI): ICD-10-CM

## 2025-08-28 RX ORDER — LEVETIRACETAM 750 MG/1
3000 TABLET, EXTENDED RELEASE ORAL NIGHTLY
Qty: 360 TABLET | Refills: 3 | Status: SHIPPED | OUTPATIENT
Start: 2025-08-28